# Patient Record
Sex: MALE | Race: WHITE
[De-identification: names, ages, dates, MRNs, and addresses within clinical notes are randomized per-mention and may not be internally consistent; named-entity substitution may affect disease eponyms.]

---

## 2018-10-26 ENCOUNTER — HOSPITAL ENCOUNTER (OUTPATIENT)
Dept: HOSPITAL 25 - OREAST | Age: 23
Discharge: HOME | End: 2018-10-26
Attending: ORTHOPAEDIC SURGERY
Payer: COMMERCIAL

## 2018-10-26 VITALS — SYSTOLIC BLOOD PRESSURE: 116 MMHG | DIASTOLIC BLOOD PRESSURE: 64 MMHG

## 2018-10-26 DIAGNOSIS — S66.321A: ICD-10-CM

## 2018-10-26 DIAGNOSIS — S64.32XA: ICD-10-CM

## 2018-10-26 DIAGNOSIS — S64.491A: ICD-10-CM
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
## 2018-10-26 DIAGNOSIS — S66.022A: Primary | ICD-10-CM

## 2018-10-27 NOTE — OP
DATE OF OPERATION:  10/26/18 [facility]

 

DATE OF BIRTH:  [birth year].

 

SURGEON:  Dahlia Pendleton MD.

 

ASSISTANT:  JESUS Kingston.

 

ANESTHESIA:  General.

 

PRE-OP DIAGNOSIS:  Left thumb, index and middle finger laceration with digital 
nerve lacerations of the index finger and flexor tendon laceration of the thumb.

 

POST-OP DIAGNOSIS:  Left thumb, index and middle finger laceration with digital 
nerve laceration of the index finger and flexor tendon laceration of the thumb 
plus ulnar digital nerve laceration of the thumb and flexor tendon laceration 
of the index finger, partial.

 

OPERATIVE PROCEDURE:  Left thumb and index finger exploration, flexor tendon 
repair of the left thumb, one digital nerve repair of the left thumb, flexor 
tendon repair of the left index finger, two digital nerve repairs of the left 
index finger.

 

ESTIMATED BLOOD LOSS:  Zero.

 

TOURNIQUET TIME:  90 minutes.

 

INDICATIONS FOR PROCEDURE:  Shane is a 23-year-old male who was using a table 
saw and he suffered severe lacerations of his left hand involving the thumb, 
index and middle fingers.  He has no deficits in the middle finger, but has no 
sensation in the index finger and decreased sensation on the portion of the 
thumb.  X-ray showed a complete dislocation of the IP joint of the thumb.  He 
presents for wound exploration, nerve and tendon repair.

 

DESCRIPTION OF PROCEDURE:  The patient was brought to the operating room, was 
given a general anesthetic and placed in a supine position on the operating 
table.  The tourniquet around his left upper arm.  The skin of the left upper 
extremity was prepped and draped in the usual sterile fashion.  The upper 
extremity was exsanguinated and the tourniquet elevated to 250 mmHg.  The 
sutures were removed from the index finger and the thumb.  The index finger 
wound was extended proximally a bit and then we explored the wound.  There was 
a complete laceration of the flexor digitorum superficialis and a partial 40% 
laceration of the flexor digitorum profundus, the FDP laceration was repaired 
with 4-0 Prolene suture and then the digital nerve lacerations were repaired 
under 4x loupe magnification with 8-0 nylon suture.  There was no gapping of 
the nerve after repair.  The wound was irrigated and the skin edges were 
reapproximated with 4-0 nylon sutures.  The thumb wound was then explored.  The 
radial digital nerve was intact.  The ulnar digital nerve was completely 
lacerated as was the flexor tendon and there were cartilage deficits at the IP 
joint on both the proximal phalanx and the distal phalanx.  The joint was 
reduced.  The flexor tendon was retrieved through the flexor tendon sheath and 
then secured to the distal phalanx through drill holes in the distal phalanx 
and with 4-0 Prolene suture grasping suture for core.  The sutures were then 
drawn through the distal phalanx and tied over a button on the fingernail. The 
digital nerve laceration was then repaired with 8-0 nylon suture again under 4x 
loupe magnification.  The wound was irrigated and the skin edges reapproximated 
with 4-0 nylon suture.  The wounds were dressed with Xeroform, 4x4, Webril, and 
a thumb spica split and a dorsal extension blocking splint for the index finger 
to protect the digital nerve repairs.  The patient tolerated the procedure well
, was awakened from general anesthesia and brought to the recovery room in good 
condition.

 

 [id number]/CPS #: [id number]

EDIN